# Patient Record
Sex: MALE | Race: WHITE | NOT HISPANIC OR LATINO | Employment: FULL TIME | URBAN - METROPOLITAN AREA
[De-identification: names, ages, dates, MRNs, and addresses within clinical notes are randomized per-mention and may not be internally consistent; named-entity substitution may affect disease eponyms.]

---

## 2023-09-22 ENCOUNTER — PATIENT OUTREACH (OUTPATIENT)
Dept: PRIMARY CARE | Facility: CLINIC | Age: 48
End: 2023-09-22
Payer: COMMERCIAL

## 2023-09-22 RX ORDER — CLOPIDOGREL BISULFATE 75 MG/1
75 TABLET ORAL DAILY
COMMUNITY

## 2023-09-22 RX ORDER — ROSUVASTATIN CALCIUM 40 MG/1
40 TABLET, COATED ORAL DAILY
COMMUNITY

## 2023-09-22 NOTE — PROGRESS NOTES
Discharge Facility: [facility]  Discharge Diagnosis: NSTEMI   Admission Date: 9/20/2023  Discharge Date: 9/21/2023    PCP Appointment Date: 9/29/2023  Specialist Appointment Date: CARIOLOGY 9/28/2023  Hospital Encounter and Summary:  not available at this time   See discharge assessment below for further details    Engagement  Call Start Time: 0930 (9/22/2023  9:30 AM)    Medications  Medications reviewed with patient/caregiver?: Yes (9/22/2023  9:30 AM)  Is the patient having any side effects they believe may be caused by any medication additions or changes?: No (9/22/2023  9:30 AM)  Does the patient have all medications ordered at discharge?: Yes (new : plavix, change: rosuvastatin) (9/22/2023  9:30 AM)  Care Management Interventions: No intervention needed (9/22/2023  9:30 AM)  Is the patient taking all medications as directed (includes completed medication regime)?: Yes (9/22/2023  9:30 AM)  Medication Comments: see med list (9/22/2023  9:30 AM)    Appointments  Does the patient have a primary care provider?: Yes (9/22/2023  9:30 AM)  Care Management Interventions: Advised patient to make appointment (9/22/2023  9:30 AM)  Has the patient kept scheduled appointments due by today?: Yes (9/22/2023  9:30 AM)  Care Management Interventions: Advised patient to keep appointment (9/22/2023  9:30 AM)    Self Management  What is the home health agency?: none (9/22/2023  9:30 AM)  Has home health visited the patient within 72 hours of discharge?: Not applicable (9/22/2023  9:30 AM)    Patient Teaching  Does the patient have access to their discharge instructions?: Yes (9/22/2023  9:30 AM)  Care Management Interventions: Reviewed instructions with patient (9/22/2023  9:30 AM)  What is the patient's perception of their health status since discharge?: Improving (9/22/2023  9:30 AM)  Is the patient/caregiver able to teach back the hierarchy of who to call/visit for symptoms/problems? PCP, Specialist, Home Health nurse,  Urgent Care, ED, 911: Yes (9/22/2023  9:30 AM)    Wrap Up  Wrap Up Additional Comments: spoke with patient. he stated that he is doing ok. reviewed medication changes and additions. patient is aware. denies chest pains and shortness of breath. has follow up with cardiology. patient stated tht he will not be following up with pcp at this time. no questions or concerns at this time. (9/22/2023  9:30 AM)  Call End Time: 0933 (9/22/2023  9:30 AM)

## 2023-09-29 ENCOUNTER — OFFICE VISIT (OUTPATIENT)
Dept: PRIMARY CARE | Facility: CLINIC | Age: 48
End: 2023-09-29
Payer: COMMERCIAL

## 2023-09-29 VITALS
HEART RATE: 76 BPM | SYSTOLIC BLOOD PRESSURE: 126 MMHG | RESPIRATION RATE: 18 BRPM | HEIGHT: 70 IN | TEMPERATURE: 97.5 F | WEIGHT: 248 LBS | DIASTOLIC BLOOD PRESSURE: 60 MMHG | BODY MASS INDEX: 35.5 KG/M2

## 2023-09-29 DIAGNOSIS — I21.4 NON-ST ELEVATION MYOCARDIAL INFARCTION (NSTEMI) (MULTI): Primary | ICD-10-CM

## 2023-09-29 PROCEDURE — 1036F TOBACCO NON-USER: CPT | Performed by: FAMILY MEDICINE

## 2023-09-29 PROCEDURE — 99212 OFFICE O/P EST SF 10 MIN: CPT | Performed by: FAMILY MEDICINE

## 2023-09-29 RX ORDER — NAPROXEN SODIUM 220 MG/1
81 TABLET, FILM COATED ORAL DAILY
COMMUNITY

## 2023-09-29 RX ORDER — METOPROLOL SUCCINATE 50 MG/1
50 TABLET, EXTENDED RELEASE ORAL DAILY
COMMUNITY
Start: 2023-09-21

## 2023-09-29 RX ORDER — PANTOPRAZOLE SODIUM 20 MG/1
20 TABLET, DELAYED RELEASE ORAL
COMMUNITY
Start: 2023-09-21

## 2023-09-29 RX ORDER — LOSARTAN POTASSIUM 25 MG/1
25 TABLET ORAL DAILY
COMMUNITY
Start: 2023-09-21

## 2023-09-29 NOTE — PROGRESS NOTES
"Subjective   Patient ID: Williams Harley is a 48 y.o. male who presents for Follow-up (Patient here for F/U from [facility] for a MI on 9/20/23 ) and heart attack.    Had MI and had angioplasty  seeing  new cardiologist and going to cardiac rehab  Did quit smoking again.    Crestor up to 40 mg   Reviewed labs  CXR         Review of Systems    Objective   /60   Pulse 76   Temp 36.4 °C (97.5 °F)   Resp 18   Ht 1.778 m (5' 10\")   Wt 112 kg (248 lb)   BMI 35.58 kg/m²     Physical Exam  Vitals and nursing note reviewed.   Constitutional:       General: He is not in acute distress.     Appearance: He is not ill-appearing.   HENT:      Head: Normocephalic and atraumatic.      Mouth/Throat:      Mouth: Mucous membranes are moist.   Eyes:      Conjunctiva/sclera: Conjunctivae normal.   Cardiovascular:      Rate and Rhythm: Normal rate and regular rhythm.      Heart sounds: Normal heart sounds.   Pulmonary:      Effort: Pulmonary effort is normal.      Breath sounds: Normal breath sounds.   Skin:     General: Skin is warm.   Neurological:      Mental Status: He is alert.   Psychiatric:         Mood and Affect: Mood normal.         Thought Content: Thought content normal.         Judgment: Judgment normal.         Assessment/Plan   Problem List Items Addressed This Visit             ICD-10-CM    Non-ST elevation myocardial infarction (NSTEMI) (CMS/HCC) - Primary I21.4    Relevant Medications    metoprolol succinate XL (Toprol-XL) 50 mg 24 hr tablet          "

## 2023-10-04 ENCOUNTER — PATIENT OUTREACH (OUTPATIENT)
Dept: PRIMARY CARE | Facility: CLINIC | Age: 48
End: 2023-10-04
Payer: COMMERCIAL

## 2023-11-02 PROBLEM — K75.81 NASH (NONALCOHOLIC STEATOHEPATITIS): Status: ACTIVE | Noted: 2023-11-02

## 2023-11-02 PROBLEM — E66.9 OBESITY, CLASS II, BMI 35-39.9: Status: ACTIVE | Noted: 2022-03-01

## 2023-11-02 PROBLEM — G47.9 SLEEP DISTURBANCE: Status: ACTIVE | Noted: 2023-11-02

## 2023-11-02 PROBLEM — F41.8 DEPRESSION WITH ANXIETY: Status: ACTIVE | Noted: 2023-11-02

## 2023-11-02 PROBLEM — R73.9 HYPERGLYCEMIA: Status: ACTIVE | Noted: 2022-12-13

## 2023-11-02 PROBLEM — I11.0 HYPERTENSIVE HEART DISEASE WITH HEART FAILURE (MULTI): Status: ACTIVE | Noted: 2022-12-13

## 2023-11-02 PROBLEM — M19.90 OSTEOARTHRITIS: Status: ACTIVE | Noted: 2023-11-02

## 2023-11-02 PROBLEM — R06.02 SHORTNESS OF BREATH: Status: ACTIVE | Noted: 2022-12-13

## 2023-11-02 PROBLEM — M47.817 LUMBOSACRAL SPONDYLOSIS WITHOUT MYELOPATHY: Status: ACTIVE | Noted: 2017-04-25

## 2023-11-02 PROBLEM — Z95.5 PRESENCE OF CORONARY ANGIOPLASTY IMPLANT AND GRAFT: Status: ACTIVE | Noted: 2022-12-13

## 2023-11-02 PROBLEM — I50.32 CHRONIC DIASTOLIC HEART FAILURE (MULTI): Status: ACTIVE | Noted: 2022-12-13

## 2023-11-02 PROBLEM — Z20.822 CONTACT WITH AND (SUSPECTED) EXPOSURE TO COVID-19: Status: ACTIVE | Noted: 2022-12-13

## 2023-11-02 PROBLEM — I25.10 ATHSCL HEART DISEASE OF NATIVE CORONARY ARTERY W/O ANG PCTRS: Status: ACTIVE | Noted: 2022-12-13

## 2023-11-02 PROBLEM — S92.009A CLOSED FRACTURE OF CALCANEUS: Status: ACTIVE | Noted: 2018-10-26

## 2023-11-02 PROBLEM — F17.200 NICOTINE DEPENDENCE, UNSPECIFIED, UNCOMPLICATED: Status: ACTIVE | Noted: 2022-12-13

## 2023-11-02 PROBLEM — K52.9 COLITIS: Status: ACTIVE | Noted: 2022-08-05

## 2023-11-02 PROBLEM — E78.00 HYPERCHOLESTEROLEMIA: Status: ACTIVE | Noted: 2023-11-02

## 2023-11-02 PROBLEM — R07.9 CHEST PAIN: Status: ACTIVE | Noted: 2022-02-28

## 2023-11-02 PROBLEM — I10 HYPERTENSION: Status: ACTIVE | Noted: 2023-11-02

## 2023-11-02 PROBLEM — E66.812 OBESITY, CLASS II, BMI 35-39.9: Status: ACTIVE | Noted: 2022-03-01

## 2023-11-02 PROBLEM — L25.9 CONTACT DERMATITIS: Status: ACTIVE | Noted: 2023-11-02

## 2023-11-02 PROBLEM — I10 BENIGN ESSENTIAL HYPERTENSION: Status: ACTIVE | Noted: 2023-09-28

## 2023-11-02 PROBLEM — I70.219 EXTREMITY ATHEROSCLEROSIS WITH INTERMITTENT CLAUDICATION (CMS-HCC): Status: ACTIVE | Noted: 2023-11-02

## 2023-11-02 PROBLEM — K21.9 GERD (GASTROESOPHAGEAL REFLUX DISEASE): Status: ACTIVE | Noted: 2023-11-02

## 2023-11-02 RX ORDER — SENNOSIDES 8.6 MG/1
1 TABLET ORAL DAILY
COMMUNITY

## 2023-11-02 RX ORDER — MULTIVITAMIN
1 TABLET ORAL DAILY
COMMUNITY

## 2023-11-02 RX ORDER — CILOSTAZOL 100 MG/1
100 TABLET ORAL
COMMUNITY

## 2023-11-02 RX ORDER — MELOXICAM 15 MG/1
15 TABLET ORAL DAILY
COMMUNITY
Start: 2015-08-19

## 2023-11-02 RX ORDER — FUROSEMIDE 40 MG/1
40 TABLET ORAL
COMMUNITY
Start: 2015-10-21

## 2023-11-02 RX ORDER — DIPHENOXYLATE HYDROCHLORIDE AND ATROPINE SULFATE 2.5; .025 MG/1; MG/1
1 TABLET ORAL
COMMUNITY

## 2023-11-02 RX ORDER — TIZANIDINE 4 MG/1
4 TABLET ORAL EVERY 8 HOURS PRN
COMMUNITY
Start: 2017-04-25

## 2023-11-02 RX ORDER — NITROGLYCERIN 0.4 MG/1
0.4 TABLET SUBLINGUAL EVERY 5 MIN PRN
COMMUNITY
Start: 2022-02-02 | End: 2024-09-27

## 2023-11-02 RX ORDER — ERGOCALCIFEROL 1.25 MG/1
1 CAPSULE ORAL WEEKLY
COMMUNITY

## 2023-11-02 RX ORDER — AMOXICILLIN 250 MG
1 CAPSULE ORAL
COMMUNITY

## 2023-11-02 RX ORDER — BUPROPION HYDROCHLORIDE 150 MG/1
150 TABLET ORAL
COMMUNITY

## 2023-11-03 ENCOUNTER — PATIENT OUTREACH (OUTPATIENT)
Dept: PRIMARY CARE | Facility: CLINIC | Age: 48
End: 2023-11-03
Payer: COMMERCIAL

## 2023-11-03 NOTE — PROGRESS NOTES
Call placed regarding one month post discharge follow up call.  At time of outreach call the patient feels as if their condition has returned to baseline since initial visit with PCP or specialist.  Questions or concerns regarding recovery period addressed at this time.No questions or concerns at this time.  Reviewed any PCP or specialists progress notes/labs/radiology reports if applicable and addressed any questions or concerns.

## 2023-11-08 ENCOUNTER — APPOINTMENT (OUTPATIENT)
Dept: CARDIOLOGY | Facility: CLINIC | Age: 48
End: 2023-11-08
Payer: COMMERCIAL

## 2023-12-05 ENCOUNTER — PATIENT OUTREACH (OUTPATIENT)
Dept: PRIMARY CARE | Facility: CLINIC | Age: 48
End: 2023-12-05
Payer: COMMERCIAL

## 2024-01-23 PROBLEM — I21.4 NON-ST ELEVATION MYOCARDIAL INFARCTION (NSTEMI) (MULTI): Status: RESOLVED | Noted: 2023-09-29 | Resolved: 2024-01-23

## 2024-01-23 PROBLEM — I11.0 HYPERTENSIVE HEART DISEASE WITH HEART FAILURE (MULTI): Status: RESOLVED | Noted: 2022-12-13 | Resolved: 2024-01-23

## 2024-01-23 PROBLEM — R07.9 CHEST PAIN: Status: RESOLVED | Noted: 2022-02-28 | Resolved: 2024-01-23

## 2024-01-23 PROBLEM — E78.5 DYSLIPIDEMIA: Status: ACTIVE | Noted: 2023-11-02

## 2024-01-23 PROBLEM — I10 BENIGN ESSENTIAL HYPERTENSION: Status: RESOLVED | Noted: 2023-09-28 | Resolved: 2024-01-23

## 2024-09-25 ENCOUNTER — HOSPITAL ENCOUNTER (OUTPATIENT)
Facility: HOSPITAL | Age: 49
Setting detail: OBSERVATION
Discharge: HOME | End: 2024-09-26
Attending: INTERNAL MEDICINE | Admitting: STUDENT IN AN ORGANIZED HEALTH CARE EDUCATION/TRAINING PROGRAM
Payer: COMMERCIAL

## 2024-09-25 DIAGNOSIS — R07.89 OTHER CHEST PAIN: Primary | ICD-10-CM

## 2024-09-25 PROBLEM — R07.9 CHEST PAIN: Status: ACTIVE | Noted: 2024-09-25

## 2024-09-25 LAB
ALBUMIN SERPL BCP-MCNC: 4.1 G/DL (ref 3.4–5)
ALP SERPL-CCNC: 60 U/L (ref 33–120)
ALT SERPL W P-5'-P-CCNC: 52 U/L (ref 10–52)
ANION GAP SERPL CALC-SCNC: 10 MMOL/L (ref 10–20)
AST SERPL W P-5'-P-CCNC: 37 U/L (ref 9–39)
BILIRUB SERPL-MCNC: 0.6 MG/DL (ref 0–1.2)
BUN SERPL-MCNC: 11 MG/DL (ref 6–23)
CALCIUM SERPL-MCNC: 8.8 MG/DL (ref 8.6–10.3)
CARDIAC TROPONIN I PNL SERPL HS: 5 NG/L (ref 0–20)
CHLORIDE SERPL-SCNC: 106 MMOL/L (ref 98–107)
CO2 SERPL-SCNC: 26 MMOL/L (ref 21–32)
CREAT SERPL-MCNC: 0.53 MG/DL (ref 0.5–1.3)
EGFRCR SERPLBLD CKD-EPI 2021: >90 ML/MIN/1.73M*2
ERYTHROCYTE [DISTWIDTH] IN BLOOD BY AUTOMATED COUNT: 13.7 % (ref 11.5–14.5)
GLUCOSE SERPL-MCNC: 103 MG/DL (ref 74–99)
HCT VFR BLD AUTO: 43.6 % (ref 41–52)
HGB BLD-MCNC: 14.7 G/DL (ref 13.5–17.5)
INR PPP: 1 (ref 0.9–1.1)
MAGNESIUM SERPL-MCNC: 1.99 MG/DL (ref 1.6–2.4)
MCH RBC QN AUTO: 33.2 PG (ref 26–34)
MCHC RBC AUTO-ENTMCNC: 33.7 G/DL (ref 32–36)
MCV RBC AUTO: 98 FL (ref 80–100)
NRBC BLD-RTO: 0 /100 WBCS (ref 0–0)
PLATELET # BLD AUTO: 244 X10*3/UL (ref 150–450)
POTASSIUM SERPL-SCNC: 3.4 MMOL/L (ref 3.5–5.3)
PROT SERPL-MCNC: 6.4 G/DL (ref 6.4–8.2)
PROTHROMBIN TIME: 11.6 SECONDS (ref 9.8–12.8)
RBC # BLD AUTO: 4.43 X10*6/UL (ref 4.5–5.9)
SODIUM SERPL-SCNC: 139 MMOL/L (ref 136–145)
WBC # BLD AUTO: 7.6 X10*3/UL (ref 4.4–11.3)

## 2024-09-25 PROCEDURE — 85027 COMPLETE CBC AUTOMATED: CPT

## 2024-09-25 PROCEDURE — 80053 COMPREHEN METABOLIC PANEL: CPT

## 2024-09-25 PROCEDURE — G0378 HOSPITAL OBSERVATION PER HR: HCPCS

## 2024-09-25 PROCEDURE — 2500000002 HC RX 250 W HCPCS SELF ADMINISTERED DRUGS (ALT 637 FOR MEDICARE OP, ALT 636 FOR OP/ED)

## 2024-09-25 PROCEDURE — 85610 PROTHROMBIN TIME: CPT

## 2024-09-25 PROCEDURE — 96372 THER/PROPH/DIAG INJ SC/IM: CPT

## 2024-09-25 PROCEDURE — 2500000001 HC RX 250 WO HCPCS SELF ADMINISTERED DRUGS (ALT 637 FOR MEDICARE OP)

## 2024-09-25 PROCEDURE — 84484 ASSAY OF TROPONIN QUANT: CPT

## 2024-09-25 PROCEDURE — 36415 COLL VENOUS BLD VENIPUNCTURE: CPT

## 2024-09-25 PROCEDURE — 83735 ASSAY OF MAGNESIUM: CPT

## 2024-09-25 PROCEDURE — 2500000004 HC RX 250 GENERAL PHARMACY W/ HCPCS (ALT 636 FOR OP/ED)

## 2024-09-25 RX ORDER — OMEPRAZOLE 20 MG/1
20 CAPSULE, DELAYED RELEASE ORAL
COMMUNITY

## 2024-09-25 RX ORDER — ROSUVASTATIN CALCIUM 20 MG/1
40 TABLET, COATED ORAL NIGHTLY
Status: DISCONTINUED | OUTPATIENT
Start: 2024-09-25 | End: 2024-09-26 | Stop reason: HOSPADM

## 2024-09-25 RX ORDER — EZETIMIBE 10 MG/1
10 TABLET ORAL DAILY
COMMUNITY

## 2024-09-25 RX ORDER — FAMOTIDINE 20 MG/1
20 TABLET, FILM COATED ORAL ONCE
Status: COMPLETED | OUTPATIENT
Start: 2024-09-25 | End: 2024-09-25

## 2024-09-25 RX ORDER — ENOXAPARIN SODIUM 100 MG/ML
40 INJECTION SUBCUTANEOUS EVERY 24 HOURS
Status: DISCONTINUED | OUTPATIENT
Start: 2024-09-25 | End: 2024-09-26 | Stop reason: HOSPADM

## 2024-09-25 RX ORDER — ACETAMINOPHEN 500 MG
10 TABLET ORAL ONCE
Status: COMPLETED | OUTPATIENT
Start: 2024-09-25 | End: 2024-09-25

## 2024-09-25 RX ADMIN — FAMOTIDINE 20 MG: 20 TABLET ORAL at 22:42

## 2024-09-25 RX ADMIN — ENOXAPARIN SODIUM 40 MG: 40 INJECTION SUBCUTANEOUS at 22:42

## 2024-09-25 RX ADMIN — Medication 10 MG: at 22:42

## 2024-09-25 RX ADMIN — ROSUVASTATIN CALCIUM 40 MG: 20 TABLET, FILM COATED ORAL at 22:42

## 2024-09-25 SDOH — SOCIAL STABILITY: SOCIAL INSECURITY: ARE YOU OR HAVE YOU BEEN THREATENED OR ABUSED PHYSICALLY, EMOTIONALLY, OR SEXUALLY BY ANYONE?: NO

## 2024-09-25 SDOH — SOCIAL STABILITY: SOCIAL INSECURITY: DO YOU FEEL ANYONE HAS EXPLOITED OR TAKEN ADVANTAGE OF YOU FINANCIALLY OR OF YOUR PERSONAL PROPERTY?: NO

## 2024-09-25 SDOH — SOCIAL STABILITY: SOCIAL INSECURITY: HAS ANYONE EVER THREATENED TO HURT YOUR FAMILY OR YOUR PETS?: NO

## 2024-09-25 SDOH — SOCIAL STABILITY: SOCIAL INSECURITY: DOES ANYONE TRY TO KEEP YOU FROM HAVING/CONTACTING OTHER FRIENDS OR DOING THINGS OUTSIDE YOUR HOME?: NO

## 2024-09-25 SDOH — SOCIAL STABILITY: SOCIAL INSECURITY: HAVE YOU HAD ANY THOUGHTS OF HARMING ANYONE ELSE?: NO

## 2024-09-25 SDOH — SOCIAL STABILITY: SOCIAL INSECURITY: ABUSE: ADULT

## 2024-09-25 SDOH — SOCIAL STABILITY: SOCIAL INSECURITY: WERE YOU ABLE TO COMPLETE ALL THE BEHAVIORAL HEALTH SCREENINGS?: YES

## 2024-09-25 SDOH — SOCIAL STABILITY: SOCIAL INSECURITY: HAVE YOU HAD THOUGHTS OF HARMING ANYONE ELSE?: NO

## 2024-09-25 SDOH — SOCIAL STABILITY: SOCIAL INSECURITY: DO YOU FEEL UNSAFE GOING BACK TO THE PLACE WHERE YOU ARE LIVING?: NO

## 2024-09-25 SDOH — SOCIAL STABILITY: SOCIAL INSECURITY: ARE THERE ANY APPARENT SIGNS OF INJURIES/BEHAVIORS THAT COULD BE RELATED TO ABUSE/NEGLECT?: NO

## 2024-09-25 ASSESSMENT — ACTIVITIES OF DAILY LIVING (ADL)
JUDGMENT_ADEQUATE_SAFELY_COMPLETE_DAILY_ACTIVITIES: YES
HEARING - LEFT EAR: FUNCTIONAL
HEARING - RIGHT EAR: FUNCTIONAL
PATIENT'S MEMORY ADEQUATE TO SAFELY COMPLETE DAILY ACTIVITIES?: YES
GROOMING: INDEPENDENT
TOILETING: INDEPENDENT
FEEDING YOURSELF: INDEPENDENT
WALKS IN HOME: INDEPENDENT
ADEQUATE_TO_COMPLETE_ADL: YES
DRESSING YOURSELF: INDEPENDENT
BATHING: INDEPENDENT

## 2024-09-25 ASSESSMENT — PATIENT HEALTH QUESTIONNAIRE - PHQ9
1. LITTLE INTEREST OR PLEASURE IN DOING THINGS: NOT AT ALL
2. FEELING DOWN, DEPRESSED OR HOPELESS: NOT AT ALL
SUM OF ALL RESPONSES TO PHQ9 QUESTIONS 1 & 2: 0

## 2024-09-25 ASSESSMENT — COGNITIVE AND FUNCTIONAL STATUS - GENERAL
MOBILITY SCORE: 24
PATIENT BASELINE BEDBOUND: NO
DAILY ACTIVITIY SCORE: 24

## 2024-09-25 ASSESSMENT — LIFESTYLE VARIABLES
SKIP TO QUESTIONS 9-10: 1
PRESCIPTION_ABUSE_PAST_12_MONTHS: NO
AUDIT-C TOTAL SCORE: 1
AUDIT-C TOTAL SCORE: 1
HOW OFTEN DO YOU HAVE A DRINK CONTAINING ALCOHOL: MONTHLY OR LESS
HOW OFTEN DO YOU HAVE 6 OR MORE DRINKS ON ONE OCCASION: NEVER
HOW MANY STANDARD DRINKS CONTAINING ALCOHOL DO YOU HAVE ON A TYPICAL DAY: 1 OR 2
SUBSTANCE_ABUSE_PAST_12_MONTHS: NO

## 2024-09-25 ASSESSMENT — COLUMBIA-SUICIDE SEVERITY RATING SCALE - C-SSRS
2. HAVE YOU ACTUALLY HAD ANY THOUGHTS OF KILLING YOURSELF?: NO
6. HAVE YOU EVER DONE ANYTHING, STARTED TO DO ANYTHING, OR PREPARED TO DO ANYTHING TO END YOUR LIFE?: NO
1. IN THE PAST MONTH, HAVE YOU WISHED YOU WERE DEAD OR WISHED YOU COULD GO TO SLEEP AND NOT WAKE UP?: NO

## 2024-09-25 ASSESSMENT — PAIN SCALES - GENERAL: PAINLEVEL_OUTOF10: 0 - NO PAIN

## 2024-09-25 ASSESSMENT — PAIN - FUNCTIONAL ASSESSMENT: PAIN_FUNCTIONAL_ASSESSMENT: 0-10

## 2024-09-26 ENCOUNTER — APPOINTMENT (OUTPATIENT)
Dept: RADIOLOGY | Facility: HOSPITAL | Age: 49
End: 2024-09-26
Payer: COMMERCIAL

## 2024-09-26 ENCOUNTER — APPOINTMENT (OUTPATIENT)
Dept: CARDIOLOGY | Facility: HOSPITAL | Age: 49
End: 2024-09-26
Payer: COMMERCIAL

## 2024-09-26 VITALS
BODY MASS INDEX: 36.11 KG/M2 | TEMPERATURE: 97.2 F | HEART RATE: 75 BPM | SYSTOLIC BLOOD PRESSURE: 133 MMHG | RESPIRATION RATE: 16 BRPM | HEIGHT: 70 IN | DIASTOLIC BLOOD PRESSURE: 75 MMHG | WEIGHT: 252.21 LBS | OXYGEN SATURATION: 97 %

## 2024-09-26 LAB
CARDIAC TROPONIN I PNL SERPL HS: 4 NG/L (ref 0–20)
HOLD SPECIMEN: NORMAL

## 2024-09-26 PROCEDURE — 3430000001 HC RX 343 DIAGNOSTIC RADIOPHARMACEUTICALS: Performed by: INTERNAL MEDICINE

## 2024-09-26 PROCEDURE — 78452 HT MUSCLE IMAGE SPECT MULT: CPT | Performed by: RADIOLOGY

## 2024-09-26 PROCEDURE — 93018 CV STRESS TEST I&R ONLY: CPT | Performed by: INTERNAL MEDICINE

## 2024-09-26 PROCEDURE — 93016 CV STRESS TEST SUPVJ ONLY: CPT | Performed by: INTERNAL MEDICINE

## 2024-09-26 PROCEDURE — 93017 CV STRESS TEST TRACING ONLY: CPT

## 2024-09-26 PROCEDURE — 2500000002 HC RX 250 W HCPCS SELF ADMINISTERED DRUGS (ALT 637 FOR MEDICARE OP, ALT 636 FOR OP/ED): Performed by: INTERNAL MEDICINE

## 2024-09-26 PROCEDURE — 2500000001 HC RX 250 WO HCPCS SELF ADMINISTERED DRUGS (ALT 637 FOR MEDICARE OP)

## 2024-09-26 PROCEDURE — 93005 ELECTROCARDIOGRAM TRACING: CPT

## 2024-09-26 PROCEDURE — 78452 HT MUSCLE IMAGE SPECT MULT: CPT

## 2024-09-26 PROCEDURE — 99222 1ST HOSP IP/OBS MODERATE 55: CPT

## 2024-09-26 PROCEDURE — 36415 COLL VENOUS BLD VENIPUNCTURE: CPT | Performed by: INTERNAL MEDICINE

## 2024-09-26 PROCEDURE — 84484 ASSAY OF TROPONIN QUANT: CPT | Performed by: INTERNAL MEDICINE

## 2024-09-26 PROCEDURE — 99239 HOSP IP/OBS DSCHRG MGMT >30: CPT | Performed by: INTERNAL MEDICINE

## 2024-09-26 PROCEDURE — A9502 TC99M TETROFOSMIN: HCPCS | Performed by: INTERNAL MEDICINE

## 2024-09-26 PROCEDURE — G0378 HOSPITAL OBSERVATION PER HR: HCPCS

## 2024-09-26 PROCEDURE — 2500000001 HC RX 250 WO HCPCS SELF ADMINISTERED DRUGS (ALT 637 FOR MEDICARE OP): Performed by: INTERNAL MEDICINE

## 2024-09-26 PROCEDURE — 93010 ELECTROCARDIOGRAM REPORT: CPT | Performed by: INTERNAL MEDICINE

## 2024-09-26 RX ORDER — PANTOPRAZOLE SODIUM 20 MG/1
20 TABLET, DELAYED RELEASE ORAL
Status: DISCONTINUED | OUTPATIENT
Start: 2024-09-27 | End: 2024-09-26 | Stop reason: HOSPADM

## 2024-09-26 RX ORDER — ACETAMINOPHEN 160 MG/5ML
650 SOLUTION ORAL EVERY 4 HOURS PRN
Status: DISCONTINUED | OUTPATIENT
Start: 2024-09-26 | End: 2024-09-26 | Stop reason: HOSPADM

## 2024-09-26 RX ORDER — ACETAMINOPHEN 325 MG/1
650 TABLET ORAL EVERY 4 HOURS PRN
Status: DISCONTINUED | OUTPATIENT
Start: 2024-09-26 | End: 2024-09-26 | Stop reason: HOSPADM

## 2024-09-26 RX ORDER — CLOPIDOGREL BISULFATE 75 MG/1
75 TABLET ORAL DAILY
Status: DISCONTINUED | OUTPATIENT
Start: 2024-09-26 | End: 2024-09-26 | Stop reason: HOSPADM

## 2024-09-26 RX ORDER — AMOXICILLIN 250 MG
1 CAPSULE ORAL 2 TIMES DAILY
Status: DISCONTINUED | OUTPATIENT
Start: 2024-09-26 | End: 2024-09-26 | Stop reason: HOSPADM

## 2024-09-26 RX ORDER — ACETAMINOPHEN 650 MG/1
650 SUPPOSITORY RECTAL EVERY 4 HOURS PRN
Status: DISCONTINUED | OUTPATIENT
Start: 2024-09-26 | End: 2024-09-26 | Stop reason: HOSPADM

## 2024-09-26 RX ORDER — HYDROCODONE BITARTRATE AND ACETAMINOPHEN 5; 325 MG/1; MG/1
1 TABLET ORAL ONCE
Status: COMPLETED | OUTPATIENT
Start: 2024-09-26 | End: 2024-09-26

## 2024-09-26 RX ORDER — METOPROLOL SUCCINATE 50 MG/1
50 TABLET, EXTENDED RELEASE ORAL DAILY
Status: DISCONTINUED | OUTPATIENT
Start: 2024-09-26 | End: 2024-09-26 | Stop reason: HOSPADM

## 2024-09-26 RX ORDER — LOSARTAN POTASSIUM 25 MG/1
25 TABLET ORAL DAILY
Status: DISCONTINUED | OUTPATIENT
Start: 2024-09-26 | End: 2024-09-26 | Stop reason: HOSPADM

## 2024-09-26 RX ORDER — IBUPROFEN 400 MG/1
400 TABLET ORAL ONCE
Status: COMPLETED | OUTPATIENT
Start: 2024-09-26 | End: 2024-09-26

## 2024-09-26 RX ORDER — EZETIMIBE 10 MG/1
10 TABLET ORAL DAILY
Status: DISCONTINUED | OUTPATIENT
Start: 2024-09-26 | End: 2024-09-26 | Stop reason: HOSPADM

## 2024-09-26 RX ORDER — NAPROXEN SODIUM 220 MG/1
81 TABLET, FILM COATED ORAL DAILY
Status: DISCONTINUED | OUTPATIENT
Start: 2024-09-26 | End: 2024-09-26 | Stop reason: HOSPADM

## 2024-09-26 RX ORDER — LIDOCAINE 560 MG/1
1 PATCH PERCUTANEOUS; TOPICAL; TRANSDERMAL DAILY
Status: DISCONTINUED | OUTPATIENT
Start: 2024-09-26 | End: 2024-09-26 | Stop reason: HOSPADM

## 2024-09-26 RX ADMIN — CLOPIDOGREL 75 MG: 75 TABLET ORAL at 08:38

## 2024-09-26 RX ADMIN — HYDROCODONE BITARTRATE AND ACETAMINOPHEN 1 TABLET: 5; 325 TABLET ORAL at 04:54

## 2024-09-26 RX ADMIN — TETROFOSMIN 11.9 MILLICURIE: 0.23 INJECTION, POWDER, LYOPHILIZED, FOR SOLUTION INTRAVENOUS at 11:35

## 2024-09-26 RX ADMIN — EZETIMIBE 10 MG: 10 TABLET ORAL at 15:32

## 2024-09-26 RX ADMIN — IBUPROFEN 400 MG: 400 TABLET, FILM COATED ORAL at 08:38

## 2024-09-26 RX ADMIN — ASPIRIN 81 MG: 81 TABLET, CHEWABLE ORAL at 08:37

## 2024-09-26 RX ADMIN — METOPROLOL SUCCINATE 50 MG: 50 TABLET, EXTENDED RELEASE ORAL at 15:32

## 2024-09-26 RX ADMIN — TETROFOSMIN 36 MILLICURIE: 0.23 INJECTION, POWDER, LYOPHILIZED, FOR SOLUTION INTRAVENOUS at 12:50

## 2024-09-26 RX ADMIN — LOSARTAN POTASSIUM 25 MG: 25 TABLET, FILM COATED ORAL at 15:32

## 2024-09-26 SDOH — SOCIAL STABILITY: SOCIAL INSECURITY
WITHIN THE LAST YEAR, HAVE YOU BEEN KICKED, HIT, SLAPPED, OR OTHERWISE PHYSICALLY HURT BY YOUR PARTNER OR EX-PARTNER?: NO

## 2024-09-26 SDOH — ECONOMIC STABILITY: INCOME INSECURITY: IN THE PAST 12 MONTHS, HAS THE ELECTRIC, GAS, OIL, OR WATER COMPANY THREATENED TO SHUT OFF SERVICE IN YOUR HOME?: NO

## 2024-09-26 SDOH — SOCIAL STABILITY: SOCIAL NETWORK
DO YOU BELONG TO ANY CLUBS OR ORGANIZATIONS SUCH AS CHURCH GROUPS UNIONS, FRATERNAL OR ATHLETIC GROUPS, OR SCHOOL GROUPS?: YES

## 2024-09-26 SDOH — HEALTH STABILITY: MENTAL HEALTH
HOW OFTEN DO YOU NEED TO HAVE SOMEONE HELP YOU WHEN YOU READ INSTRUCTIONS, PAMPHLETS, OR OTHER WRITTEN MATERIAL FROM YOUR DOCTOR OR PHARMACY?: NEVER

## 2024-09-26 SDOH — SOCIAL STABILITY: SOCIAL INSECURITY: WITHIN THE LAST YEAR, HAVE YOU BEEN HUMILIATED OR EMOTIONALLY ABUSED IN OTHER WAYS BY YOUR PARTNER OR EX-PARTNER?: NO

## 2024-09-26 SDOH — ECONOMIC STABILITY: FOOD INSECURITY: WITHIN THE PAST 12 MONTHS, YOU WORRIED THAT YOUR FOOD WOULD RUN OUT BEFORE YOU GOT MONEY TO BUY MORE.: NEVER TRUE

## 2024-09-26 SDOH — SOCIAL STABILITY: SOCIAL INSECURITY: WITHIN THE LAST YEAR, HAVE YOU BEEN AFRAID OF YOUR PARTNER OR EX-PARTNER?: NO

## 2024-09-26 SDOH — HEALTH STABILITY: MENTAL HEALTH
STRESS IS WHEN SOMEONE FEELS TENSE, NERVOUS, ANXIOUS, OR CAN'T SLEEP AT NIGHT BECAUSE THEIR MIND IS TROUBLED. HOW STRESSED ARE YOU?: NOT AT ALL

## 2024-09-26 SDOH — SOCIAL STABILITY: SOCIAL NETWORK: ARE YOU MARRIED, WIDOWED, DIVORCED, SEPARATED, NEVER MARRIED, OR LIVING WITH A PARTNER?: MARRIED

## 2024-09-26 SDOH — SOCIAL STABILITY: SOCIAL NETWORK
IN A TYPICAL WEEK, HOW MANY TIMES DO YOU TALK ON THE PHONE WITH FAMILY, FRIENDS, OR NEIGHBORS?: MORE THAN THREE TIMES A WEEK

## 2024-09-26 SDOH — SOCIAL STABILITY: SOCIAL INSECURITY
WITHIN THE LAST YEAR, HAVE TO BEEN RAPED OR FORCED TO HAVE ANY KIND OF SEXUAL ACTIVITY BY YOUR PARTNER OR EX-PARTNER?: NO

## 2024-09-26 SDOH — ECONOMIC STABILITY: FOOD INSECURITY: WITHIN THE PAST 12 MONTHS, THE FOOD YOU BOUGHT JUST DIDN'T LAST AND YOU DIDN'T HAVE MONEY TO GET MORE.: NEVER TRUE

## 2024-09-26 SDOH — ECONOMIC STABILITY: INCOME INSECURITY: HOW HARD IS IT FOR YOU TO PAY FOR THE VERY BASICS LIKE FOOD, HOUSING, MEDICAL CARE, AND HEATING?: NOT HARD AT ALL

## 2024-09-26 SDOH — ECONOMIC STABILITY: TRANSPORTATION INSECURITY
IN THE PAST 12 MONTHS, HAS THE LACK OF TRANSPORTATION KEPT YOU FROM MEDICAL APPOINTMENTS OR FROM GETTING MEDICATIONS?: NO

## 2024-09-26 SDOH — HEALTH STABILITY: PHYSICAL HEALTH: ON AVERAGE, HOW MANY MINUTES DO YOU ENGAGE IN EXERCISE AT THIS LEVEL?: 30 MIN

## 2024-09-26 SDOH — ECONOMIC STABILITY: HOUSING INSECURITY: IN THE PAST 12 MONTHS, HOW MANY TIMES HAVE YOU MOVED WHERE YOU WERE LIVING?: 1

## 2024-09-26 SDOH — SOCIAL STABILITY: SOCIAL NETWORK: HOW OFTEN DO YOU ATTEND CHURCH OR RELIGIOUS SERVICES?: 1 TO 4 TIMES PER YEAR

## 2024-09-26 SDOH — HEALTH STABILITY: PHYSICAL HEALTH: ON AVERAGE, HOW MANY DAYS PER WEEK DO YOU ENGAGE IN MODERATE TO STRENUOUS EXERCISE (LIKE A BRISK WALK)?: 4 DAYS

## 2024-09-26 SDOH — ECONOMIC STABILITY: INCOME INSECURITY: IN THE LAST 12 MONTHS, WAS THERE A TIME WHEN YOU WERE NOT ABLE TO PAY THE MORTGAGE OR RENT ON TIME?: NO

## 2024-09-26 SDOH — SOCIAL STABILITY: SOCIAL NETWORK: HOW OFTEN DO YOU ATTENT MEETINGS OF THE CLUB OR ORGANIZATION YOU BELONG TO?: 1 TO 4 TIMES PER YEAR

## 2024-09-26 SDOH — ECONOMIC STABILITY: HOUSING INSECURITY: AT ANY TIME IN THE PAST 12 MONTHS, WERE YOU HOMELESS OR LIVING IN A SHELTER (INCLUDING NOW)?: NO

## 2024-09-26 SDOH — ECONOMIC STABILITY: TRANSPORTATION INSECURITY
IN THE PAST 12 MONTHS, HAS LACK OF TRANSPORTATION KEPT YOU FROM MEETINGS, WORK, OR FROM GETTING THINGS NEEDED FOR DAILY LIVING?: NO

## 2024-09-26 SDOH — SOCIAL STABILITY: SOCIAL NETWORK: HOW OFTEN DO YOU GET TOGETHER WITH FRIENDS OR RELATIVES?: MORE THAN THREE TIMES A WEEK

## 2024-09-26 ASSESSMENT — PAIN SCALES - GENERAL
PAINLEVEL_OUTOF10: 0 - NO PAIN
PAINLEVEL_OUTOF10: 0 - NO PAIN
PAINLEVEL_OUTOF10: 6
PAINLEVEL_OUTOF10: 7

## 2024-09-26 ASSESSMENT — PAIN - FUNCTIONAL ASSESSMENT
PAIN_FUNCTIONAL_ASSESSMENT: 0-10
PAIN_FUNCTIONAL_ASSESSMENT: 0-10

## 2024-09-26 ASSESSMENT — PAIN DESCRIPTION - ORIENTATION: ORIENTATION: LOWER

## 2024-09-26 ASSESSMENT — PAIN DESCRIPTION - LOCATION: LOCATION: BACK

## 2024-09-26 NOTE — PROGRESS NOTES
Spiritual Care Visit    Clinical Encounter Type  Visited With: Patient  Routine Visit: Introduction  Continue Visiting: No                                            Taxonomy  Intended Effects: Promote sense of peace, Preserve dignity and respect, Meaning-making  Methods: Offer spiritual/Presybeterian support  Interventions: Share words of hope and inspiration, [place]    Patient was with his wife.  Patient talked about his children.   listened to his story and provided companionship and validation.   prayed at his request.

## 2024-09-26 NOTE — CARE PLAN
The patient's goals for the shift include sleep    The clinical goals for the shift include pt will remain hemodynamically stable this shift    Over the shift, the patient did make progress toward the following goals. Pt denied c/p, pressure since admission.  Pt compliant with npo- Dr Jones to see.  Safety measures maintainted.

## 2024-09-26 NOTE — CARE PLAN
The clinical goals for the shift include remain free from chest pain this shift      Problem: Pain - Adult  Goal: Verbalizes/displays adequate comfort level or baseline comfort level  Outcome: Progressing  Flowsheets (Taken 9/26/2024 1051)  Verbalizes/displays adequate comfort level or baseline comfort level:   Encourage patient to monitor pain and request assistance   Assess pain using appropriate pain scale   Administer analgesics based on type and severity of pain and evaluate response   Implement non-pharmacological measures as appropriate and evaluate response   Consider cultural and social influences on pain and pain management   Notify Licensed Independent Practitioner if interventions unsuccessful or patient reports new pain     Problem: Safety - Adult  Goal: Free from fall injury  Outcome: Progressing  Flowsheets (Taken 9/26/2024 1051)  Free from fall injury:   Instruct family/caregiver on patient safety   Based on caregiver fall risk screen, instruct family/caregiver to ask for assistance with transferring infant if caregiver noted to have fall risk factors     Problem: Discharge Planning  Goal: Discharge to home or other facility with appropriate resources  Outcome: Progressing  Flowsheets (Taken 9/26/2024 1051)  Discharge to home or other facility with appropriate resources:   Identify barriers to discharge with patient and caregiver   Arrange for needed discharge resources and transportation as appropriate   Identify discharge learning needs (meds, wound care, etc)   Arrange for interpreters to assist at discharge as needed   Refer to discharge planning if patient needs post-hospital services based on physician order or complex needs related to functional status, cognitive ability or social support system     Problem: Chronic Conditions and Co-morbidities  Goal: Patient's chronic conditions and co-morbidity symptoms are monitored and maintained or improved  Outcome: Progressing  Flowsheets (Taken  9/26/2024 1051)  Care Plan - Patient's Chronic Conditions and Co-Morbidity Symptoms are Monitored and Maintained or Improved:   Monitor and assess patient's chronic conditions and comorbid symptoms for stability, deterioration, or improvement   Collaborate with multidisciplinary team to address chronic and comorbid conditions and prevent exacerbation or deterioration   Update acute care plan with appropriate goals if chronic or comorbid symptoms are exacerbated and prevent overall improvement and discharge     Problem: Fall/Injury  Goal: Not fall by end of shift  Outcome: Progressing  Goal: Be free from injury by end of the shift  Outcome: Progressing  Goal: Verbalize understanding of personal risk factors for fall in the hospital  Outcome: Progressing     Problem: Pain  Goal: Takes deep breaths with improved pain control throughout the shift  Outcome: Progressing  Goal: Turns in bed with improved pain control throughout the shift  Outcome: Progressing  Goal: Walks with improved pain control throughout the shift  Outcome: Progressing  Goal: Performs ADL's with improved pain control throughout shift  Outcome: Progressing  Goal: Participates in PT with improved pain control throughout the shift  Outcome: Progressing  Goal: Free from opioid side effects throughout the shift  Outcome: Progressing  Goal: Free from acute confusion related to pain meds throughout the shift  Outcome: Progressing     Problem: ACS/CP/NSTEMI/STEMI  Goal: Chest pain managed (free from pain or at acceptable level)  Outcome: Progressing  Flowsheets (Taken 9/26/2024 1051)  Chest pain managed (free from pain or at acceptable level): Eliminate/minimize actual/potential pain  Goal: Promote self management  Outcome: Progressing  Flowsheets (Taken 9/26/2024 1051)  Promote self management:   Encourage activity/cluster activity to conserve energy   Promote nutrition/identify dietary restrictions   Monitor for depression/anxiety/coping ability

## 2024-09-26 NOTE — H&P
History Of Present Illness  53 YOM PMH CAD s/p PCI to LCx and RCA, dyslipidemia, and chronic diastolic heart failure presenting to [facility] C/O chest pain.    Pain began this a.m. while patient was sitting at his desk.  Duration lasted for 2 to 3 hours he stated.  Pain described as a dull achy pain in the left side of his chest radiating to the shoulder.  Did not radiate to the neck or jaw or back.  He denies associated symptoms of SOB, palpitations, diaphoresis, nausea/emesis, dizziness or presyncope.    Records are not viewable from [facility], he was transferred to [facility] due to request to be evaluated by his cardiologist Dr. Jones.     Past Medical History  He has a past medical history of Chronic systolic (congestive) heart failure (Multi) (01/03/2017), Hemorrhage of anus and rectum (12/14/2015), Personal history of other infectious and parasitic diseases, Pure hyperglyceridemia, Sciatica, unspecified side, and ST elevation (STEMI) myocardial infarction involving right coronary artery (Multi) (10/12/2015).    Surgical History  He has a past surgical history that includes Back surgery (12/14/2015) and Other surgical history (12/14/2015).     Social History  He reports that he has quit smoking. His smoking use included cigarettes. He has never used smokeless tobacco. He reports current alcohol use. He reports that he does not use drugs.    Family History  Family History   Problem Relation Name Age of Onset    No Known Problems Mother      No Known Problems Father          Allergies  Ticagrelor, Ciprofloxacin, and Atorvastatin    Review of Systems    12 pt ROS obtained: positives & pertinent negatives listed in HPI   Physical Exam   Constitutional: A&Ox4, NAD, resting comfortable   Head and Face: Atraumatic, normocephalic   Eyes: Normal external exam, EOMI  ENT: Normal external inspection of ears and nose. Oropharynx normal.  Cardiovascular: RRR, S1/S2, no murmurs, rubs, or gallops, radial pulses +2  Pulmonary: CTAB, no  respiratory distress, no wheezing, rales or rhonchi, on RA  Abdomen: +BS, soft, non-tender, nondistended, no guarding rigidity or rebound tenderness, no masses noted  MSK: Negative for edema, No joint swelling, normal movements of all extremities.   Neuro: No focal deficits, normal motor function, normal sensation, follows all commands  Skin- No lesions, contusions, or erythema.  Psychiatric: Judgment intact. Appropriate mood, affect and behavior   Last Recorded Vitals  /56 (BP Location: Left arm, Patient Position: Lying)   Pulse 80   Temp 36 °C (96.8 °F)   Resp 16   Wt 114 kg (252 lb 3.3 oz)   SpO2 93%     Relevant Results  Results for orders placed or performed during the hospital encounter of 09/25/24 (from the past 24 hour(s))   CBC   Result Value Ref Range    WBC 7.6 4.4 - 11.3 x10*3/uL    nRBC 0.0 0.0 - 0.0 /100 WBCs    RBC 4.43 (L) 4.50 - 5.90 x10*6/uL    Hemoglobin 14.7 13.5 - 17.5 g/dL    Hematocrit 43.6 41.0 - 52.0 %    MCV 98 80 - 100 fL    MCH 33.2 26.0 - 34.0 pg    MCHC 33.7 32.0 - 36.0 g/dL    RDW 13.7 11.5 - 14.5 %    Platelets 244 150 - 450 x10*3/uL   Comprehensive metabolic panel   Result Value Ref Range    Glucose 103 (H) 74 - 99 mg/dL    Sodium 139 136 - 145 mmol/L    Potassium 3.4 (L) 3.5 - 5.3 mmol/L    Chloride 106 98 - 107 mmol/L    Bicarbonate 26 21 - 32 mmol/L    Anion Gap 10 10 - 20 mmol/L    Urea Nitrogen 11 6 - 23 mg/dL    Creatinine 0.53 0.50 - 1.30 mg/dL    eGFR >90 >60 mL/min/1.73m*2    Calcium 8.8 8.6 - 10.3 mg/dL    Albumin 4.1 3.4 - 5.0 g/dL    Alkaline Phosphatase 60 33 - 120 U/L    Total Protein 6.4 6.4 - 8.2 g/dL    AST 37 9 - 39 U/L    Bilirubin, Total 0.6 0.0 - 1.2 mg/dL    ALT 52 10 - 52 U/L   Protime-INR   Result Value Ref Range    Protime 11.6 9.8 - 12.8 seconds    INR 1.0 0.9 - 1.1   Magnesium   Result Value Ref Range    Magnesium 1.99 1.60 - 2.40 mg/dL   Troponin I, High Sensitivity   Result Value Ref Range    Troponin I, High Sensitivity 5 0 - 20 ng/L                Assessment/Plan   Assessment & Plan  Chest pain  Hx of CAD with PCI  Hx HFpEF not in exacerbation  Hx of dyslipidemia    53 YOM PMH CAD s/p PCI to LCx and RCA, dyslipidemia, and chronic diastolic heart failure presenting to [facility] C/O chest pain. ain began this a.m. while patient was sitting at his desk.  Duration lasted for 2 to 3 hours he stated.  Pain described as a dull achy pain in the left side of his chest radiating to the shoulder.  [facility] records are not viewable.  Chest pain appears to be noncardiac in nature however he does have multiple risk factors previous MIs as well as active smoker.    - Check troponin, and repeat AM troponin  - EKG pending   - Cardiology consulted, appreciate recommendations  - Resume crestor, and DAPT  - Telemetry   - NPO after MN until seen by cardiology     IVF: None  DVT Ppx: Lovenox  Diet: NPO  Code Status: FULL CODE confirmed at bedside       Complexity moderate: anticipate 1 MN stay       Bethel Diez DO

## 2024-09-26 NOTE — CONSULTS
Consults    Reason For Consult  Angina    History Of Present Illness  Mr. Harley is a 49-year-old male who presented to [facility] ED 9/26 as a transfer from [facility] at the request of the patient to be seen by home cardiologist, Dr. Jones, with complaints of chest pain that first began at 7 AM the morning prior to presentation while he was at rest at his desk.  Angina lasted 2 -3 hours, described as a dull ache in the left side of his chest with painful radiation to his shoulder.  There was no jaw or neck radiation.  No symptoms of SOB, palpitations, diaphoresis, nausea/vomiting, dizziness or syncope/presyncope.  The angina resolved spontaneously without the use of nitro or other analgesics.  He tells me that the discomfort he experienced yesterday was different than the other symptoms where he was diagnosed with myocardial infarctions. Up until 7 AM yesterday, he was in his normal state of health. Not complaining of any SOB or angina at rest or upon exertion.  He states that he took his blood pressure which was noted to be 157/97, and due to persistent chest discomfort with elevated BP, decided to go to the ER.  By the time he arrived to the ED, his angina and chest discomfort with shoulder pain had resolved.  There is no strenuous activity or other change to his daily events prior to his angina at 7 AM.  He continues to smoke 1.5 PPD.  Changed to social history prior to 7 AM event was that he had 4 cups of 16 ounces of coffee consumption, whereas he normally has 2 cups.  He has been pretty compliant with DAPT and his other cardiac medications.  He states that he does take an intermittent Lasix should he have increased swelling.  He has not taken a Lasix dose for 2 months.  At the time my examination, he is completely asymptomatic from a cardiac standpoint.  He is only complaining of mild chronic low back pain.  He is saturating well on room air and appears comfortable.    ED course:  Vitals: VSS HDS on room air.  No  hypotension or tachycardia/bradycardia.   Labs: CBC and CMP grossly unremarkable with the exception of mild hypokalemia of 3.4  Cardiac: Troponin 5, 4.  No BNP collected.  No CXR collected.  EKG NSR with nonpathologic Q-wave in lead II.  No ST-T changes or other evidence of acute ischemia.  VR 82, QTc 467    Last visit with cardiology, Dr. Jones, 3/2023: During this visit he denied angina and was complaining of stable/improved dyspnea while wearing a mask at work.  He was having some daytime somnolence and having trouble falling and staying asleep.  At the conclusion of this office visit, he was to continue aspirin and high intensity statin.  His clopidogrel was discontinued given and he had been on it more than a year since his PCI.  For his chronic diastolic heart failure, he was to continue Lasix intermittently and continue his metoprolol succinate 25 mg.  His hypertension was well-controlled at that time.  He was encouraged to obtain a polysomnography, however patient wanted to think about retaining the study.  It was recommended that he RTO in 6 months or sooner if needed.  He did suffer another cardiac event 9/2023 where he had severe in-stent restenosis of the distal RCA.  He subsequent underwent angioplasty with balloon to the distal RCA lesion.  He was placed back on DAPT with Plavix and aspirin.    PMH: CAD s/p PCI to the LCx (3/2022) and RCA (6/2017 and 9/2015, angioplasty to distal RCA lesion with balloon 9/2023 (patient had severe in-stent restenosis and distal RCA extending into RPDA at site of prior overlapping stents), HLD, chronic diastolic heart failure, obesity, GERD, DOBBS, OA, tobacco abuse    Nuclear stress test 2/2022: EF 54%.  No evidence of ischemia or scar.  Apical thinning (artifact)  PVR with exercise 4/2018: Mild ischemia of the right leg at rest worsening with ambulation though still in the mild range.  Left lower extremity with significant disease in the JAQUELIN with exercise however no  evidence of ischemia.  Vasculogenic claudication in the right greater than left leg  TTE 9/2023: EF 50%, normal LV size and function.  Normal RV size and function.  Mild to moderate MR.  Mild TR     Past Medical History  He has a past medical history of Chronic systolic (congestive) heart failure (Multi) (01/03/2017), Hemorrhage of anus and rectum (12/14/2015), Personal history of other infectious and parasitic diseases, Pure hyperglyceridemia, Sciatica, unspecified side, and ST elevation (STEMI) myocardial infarction involving right coronary artery (Multi) (10/12/2015).    Surgical History  He has a past surgical history that includes Back surgery (12/14/2015) and Other surgical history (12/14/2015).     Social History  He reports that he has quit smoking. His smoking use included cigarettes. He has never used smokeless tobacco. He reports current alcohol use. He reports that he does not use drugs.    Family History  Family History   Problem Relation Name Age of Onset    No Known Problems Mother      No Known Problems Father          Allergies  Ticagrelor, Ciprofloxacin, and Atorvastatin    Review of Systems  Negative as otherwise stated above     Physical Exam  VITALS: I have reviewed the vital signs.   GENERAL: Well developed adult in no acute distress.  Resting comfortably in bed.  NEURO: Alert and oriented x3, able to answer questions and follow commands. No motor or sensory deficits. Moves all extremities. Face is symmetric and expressive. No tremor.  EYES: PERRL. No scleral icterus or conjunctival injection. No discharge.   HENT: Normocephalic, atraumatic. Hearing is grossly intact. Nares grossly patent and without discharge. Mucous membranes moist.   NECK: No JVD. Patient moves neck without restriction.   CARDIO: Rhythm regular. Normal rate. No murmur, rub, or gallop. Pulses equal bilaterally in the upper and lower extremity. No lower extremity edema.   PULM: Lungs clear to auscultation in all fields. No  wheezes, rales, or rhonchi. No conversational dyspnea. No splinting, stridor, or accessory muscle use.    GI: Abdomen is soft and non-distended. No tenderness to palpation. No guarding or rigidity. Normoactive bowel sounds.   : No suprapubic tenderness. No CVA tenderness.  EXTREMITIES: Symmetric muscle bulk. No joint swelling. No clubbing, cyanosis, or deformity. Muscle strength 5/5 in b/l upper and lower extremities  SKIN: Warm and dry. Normal turgor. No rash or lesions appreciated.   PSYCH: Mood, affect, and interaction is appropriate to the setting. Not internally stimulated.    Last Recorded Vitals  /68 (BP Location: Left arm, Patient Position: Lying)   Pulse 69   Temp 36 °C (96.8 °F) (Temporal)   Resp 16   Wt 114 kg (252 lb 3.3 oz)   SpO2 96%     Relevant Results  Scheduled medications  aspirin, 81 mg, oral, Daily  clopidogrel, 75 mg, oral, Daily  enoxaparin, 40 mg, subcutaneous, q24h  rosuvastatin, 40 mg, oral, Nightly      Continuous medications     PRN medications    Results for orders placed or performed during the hospital encounter of 09/25/24 (from the past 24 hour(s))   CBC   Result Value Ref Range    WBC 7.6 4.4 - 11.3 x10*3/uL    nRBC 0.0 0.0 - 0.0 /100 WBCs    RBC 4.43 (L) 4.50 - 5.90 x10*6/uL    Hemoglobin 14.7 13.5 - 17.5 g/dL    Hematocrit 43.6 41.0 - 52.0 %    MCV 98 80 - 100 fL    MCH 33.2 26.0 - 34.0 pg    MCHC 33.7 32.0 - 36.0 g/dL    RDW 13.7 11.5 - 14.5 %    Platelets 244 150 - 450 x10*3/uL   Comprehensive metabolic panel   Result Value Ref Range    Glucose 103 (H) 74 - 99 mg/dL    Sodium 139 136 - 145 mmol/L    Potassium 3.4 (L) 3.5 - 5.3 mmol/L    Chloride 106 98 - 107 mmol/L    Bicarbonate 26 21 - 32 mmol/L    Anion Gap 10 10 - 20 mmol/L    Urea Nitrogen 11 6 - 23 mg/dL    Creatinine 0.53 0.50 - 1.30 mg/dL    eGFR >90 >60 mL/min/1.73m*2    Calcium 8.8 8.6 - 10.3 mg/dL    Albumin 4.1 3.4 - 5.0 g/dL    Alkaline Phosphatase 60 33 - 120 U/L    Total Protein 6.4 6.4 - 8.2 g/dL     AST 37 9 - 39 U/L    Bilirubin, Total 0.6 0.0 - 1.2 mg/dL    ALT 52 10 - 52 U/L   Protime-INR   Result Value Ref Range    Protime 11.6 9.8 - 12.8 seconds    INR 1.0 0.9 - 1.1   Magnesium   Result Value Ref Range    Magnesium 1.99 1.60 - 2.40 mg/dL   Troponin I, High Sensitivity   Result Value Ref Range    Troponin I, High Sensitivity 5 0 - 20 ng/L   Troponin I, High Sensitivity   Result Value Ref Range    Troponin I, High Sensitivity 4 0 - 20 ng/L     No results found.       Assessment/Plan     # Angina, unstable  # CAD s/p multiple PCI, most recent 9/2023  # Tobacco abuse  # Chronic diastolic HF, stable and not in exacerbation  Symptoms occurred for patient at rest beginning 7 AM yesterday.  He continues to smoke 1.5 PPD.  He had significant increase in caffeine use yesterday outside of his baseline which could have stimulated symptomatology. Symptoms lasted approximately 2 to 3 hours and resolved spontaneously. He suffered dull angina in the left side of his chest with radiation and pain to the left shoulder.  He never suffered diaphoresis, palpitations, SOB.  Given significant cardiac history and concern for recurrent MI, the patient proceeded to the ER for further evaluation  -Troponin negative x 2.  EKG stable without ischemia.  Nonpathologic Q-wave in lead II  -Given recent PCI in 9/2023, continue ASA and Plavix inpatient  -At this time, will undergo Lexiscan for further evaluation  -Pending results of Lexiscan, may discontinue Plavix as it has been approximately 1 year since last PCI  -Patient counseled on smoking cessation.  He will think about quitting.  Per last PCP note 9/2023, he had reported that he had stopped smoking.  Would entertain nicotine patch as inpatient with nicotine gum/patch/other medicinal aid for tobacco cessation if patient agreeable  -Continue metoprolol and Crestor and as needed Lasix  -Cardiology will continue to follow      Patient seen and discussed with attending  physician    Carlos Osborne, DO  Internal Medicine, PGY-III

## 2024-09-26 NOTE — DISCHARGE SUMMARY
Discharge Diagnosis  Chest pain    Issues Requiring Follow-Up  Please see PCP in 1 week  Please see cardiology dr jones in 2 weeks or as discussed       Discharge Meds     Medication List      CONTINUE taking these medications     aspirin 81 mg chewable tablet   clopidogrel 75 mg tablet; Commonly known as: Plavix   ezetimibe 10 mg tablet; Commonly known as: Zetia   losartan 25 mg tablet; Commonly known as: Cozaar   metoprolol succinate XL 50 mg 24 hr tablet; Commonly known as: Toprol-XL   omeprazole 20 mg DR capsule; Commonly known as: PriLOSEC   rosuvastatin 40 mg tablet; Commonly known as: Crestor   sennosides-docusate sodium 8.6-50 mg tablet; Commonly known as:   Vi-Colace       Test Results Pending At Discharge  Pending Labs       No current pending labs.            Hospital Course   53 YOM PMH CAD s/p PCI to LCx and RCA, dyslipidemia, and chronic diastolic heart failure presenting to [facility] C/O chest pain.     Pain began this a.m. while patient was sitting at his desk.  Duration lasted for 2 to 3 hours he stated.  Pain described as a dull achy pain in the left side of his chest radiating to the shoulder.  Did not radiate to the neck or jaw or back.  He denies associated symptoms of SOB, palpitations, diaphoresis, nausea/emesis, dizziness or presyncope.     Records are not viewable from [facility], he was transferred to [facility] due to request to be evaluated by his cardiologist Dr. Jones.    Hospital course:  Patient is a 53-year-old male with history of CAD status post 4 cardiac stents, CHF, hyperlipidemia who presented with chest pain.  Patient's troponin was negative, cardiology was consulted.  EKG showed no ischemic changes.  Patient went for stress test, no evidence of inducible myocardial ischemia.  Awaiting final cardiology input however plan will be for discharge after seen by cardiology Dr. Jones today.  I did discuss results with patient.  I have prepped his discharge pending cardiac clearance. See  PCP in 1 week and cardio dr jones in 2 weeks.    Spent > 35 minutes DC patient    Pertinent Physical Exam At Time of Discharge  Physical Exam   Constitutional: A&Ox4, NAD, resting comfortable   Head and Face: Atraumatic, normocephalic   Eyes: Normal external exam, EOMI  ENT: Normal external inspection of ears and nose. Oropharynx normal.  Cardiovascular: RRR, S1/S2, no murmurs, rubs, or gallops, radial pulses +2  Pulmonary: CTAB, no respiratory distress, no wheezing, rales or rhonchi, on RA  Abdomen: +BS, soft, non-tender, nondistended, no guarding rigidity or rebound tenderness, no masses noted  MSK: Negative for edema, No joint swelling, normal movements of all extremities.   Neuro: No focal deficits, normal motor function, normal sensation, follows all commands  Skin- No lesions, contusions, or erythema.  Psychiatric: Judgment intact. Appropriate mood, affect and behavior     Outpatient Follow-Up  Future Appointments   Date Time Provider Department Center   11/21/2024  4:15 PM Jerry Jones MD [id number] [place]         Rocio Farrell DO

## 2024-09-26 NOTE — PROGRESS NOTES
09/26/24 1043   Discharge Planning   Living Arrangements Spouse/significant other   Support Systems Spouse/significant other   Type of Residence Private residence   Home or Post Acute Services None   Expected Discharge Disposition Home   Does the patient need discharge transport arranged? No     Met with patient at bedside. Admitted for chest pain. Pt lives with spouse and was independent PTA with no HHC or DME. PCP is Luann Shelton. Pt feels he is able to manage his health and understands his medications. Pt works full time, is able to drive and obtain medications. Pt plans to return home with no new discharge needs.

## 2024-09-26 NOTE — SIGNIFICANT EVENT
Patient seen and examined, no acute events overnight.  Patient is a 49-year-old male with history of CAD status post 4 cardiac stents who presented with chest pain.  Cardiology was consulted and patient is going to have a stress test today.  He denies any current chest pain.  He appears comfortable on exam.  His vitals are stable.  His home meds have been ordered.  Anticipate DC within the next 24 hours pending stress test results and cardiology input/plan. He is on aspirin, statin and plavix.

## 2024-09-27 LAB
ATRIAL RATE: 82 BPM
P AXIS: 45 DEGREES
P OFFSET: 207 MS
P ONSET: 155 MS
PR INTERVAL: 148 MS
Q ONSET: 229 MS
QRS COUNT: 13 BEATS
QRS DURATION: 74 MS
QT INTERVAL: 400 MS
QTC CALCULATION(BAZETT): 467 MS
QTC FREDERICIA: 444 MS
R AXIS: 81 DEGREES
T AXIS: 26 DEGREES
T OFFSET: 429 MS
VENTRICULAR RATE: 82 BPM

## 2024-10-04 DIAGNOSIS — I50.32 CHRONIC DIASTOLIC HEART FAILURE: Primary | ICD-10-CM

## 2024-11-21 ENCOUNTER — OFFICE VISIT (OUTPATIENT)
Dept: CARDIOLOGY | Facility: CLINIC | Age: 49
End: 2024-11-21
Payer: COMMERCIAL

## 2024-11-21 ENCOUNTER — APPOINTMENT (OUTPATIENT)
Dept: CARDIOLOGY | Facility: CLINIC | Age: 49
End: 2024-11-21
Payer: COMMERCIAL

## 2024-11-21 VITALS
SYSTOLIC BLOOD PRESSURE: 134 MMHG | OXYGEN SATURATION: 97 % | WEIGHT: 250 LBS | DIASTOLIC BLOOD PRESSURE: 74 MMHG | BODY MASS INDEX: 35.79 KG/M2 | HEART RATE: 70 BPM | HEIGHT: 70 IN

## 2024-11-21 DIAGNOSIS — I25.10 ATHSCL HEART DISEASE OF NATIVE CORONARY ARTERY W/O ANG PCTRS: Primary | ICD-10-CM

## 2024-11-21 DIAGNOSIS — G47.20 DISTURBED SLEEP RHYTHM: ICD-10-CM

## 2024-11-21 PROCEDURE — 3078F DIAST BP <80 MM HG: CPT | Performed by: INTERNAL MEDICINE

## 2024-11-21 PROCEDURE — 93010 ELECTROCARDIOGRAM REPORT: CPT | Performed by: INTERNAL MEDICINE

## 2024-11-21 PROCEDURE — 93005 ELECTROCARDIOGRAM TRACING: CPT | Performed by: INTERNAL MEDICINE

## 2024-11-21 PROCEDURE — 3075F SYST BP GE 130 - 139MM HG: CPT | Performed by: INTERNAL MEDICINE

## 2024-11-21 PROCEDURE — 99214 OFFICE O/P EST MOD 30 MIN: CPT | Performed by: INTERNAL MEDICINE

## 2024-11-21 PROCEDURE — 1036F TOBACCO NON-USER: CPT | Performed by: INTERNAL MEDICINE

## 2024-11-21 PROCEDURE — 3008F BODY MASS INDEX DOCD: CPT | Performed by: INTERNAL MEDICINE

## 2024-11-21 ASSESSMENT — PAIN SCALES - GENERAL: PAINLEVEL_OUTOF10: 0-NO PAIN

## 2024-11-21 ASSESSMENT — ENCOUNTER SYMPTOMS
LOSS OF SENSATION IN FEET: 0
OCCASIONAL FEELINGS OF UNSTEADINESS: 0
DEPRESSION: 0

## 2024-11-21 NOTE — PROGRESS NOTES
Chief Complaint:   No chief complaint on file.     History Of Present Illness:    Williams Harley is a 49 y.o. male with a history of CAD s/p PCI to LCx and RCA, dyslipidemia, and chronic diastolic heart failure being evaluated for routine follow-up.     Patient presented to the hospital 9/26/2024 for chest pain.  Enzymes were negative and stress test demonstrated no evidence of ischemia.  In talking to him it seemed like he had not been sleeping very well.  We discussed being evaluated for sleep apnea as an outpatient.  He has not yet scheduled this.    He has had no recurrence of chest pain.  He says he feels well.  No palpitations or shortness of breath.    Lexiscan nuclear stress test 9/26/2024: Fixed defect in the mid to distal anterior wall concerning for scar.  Hypokinesis and abnormal wall thickening in the same segment.  EF 45%.     PCI LCx March 2022     Lexiscan nuclear stress test 2/25/2022: No evidence of ischemia or scar. With thinning artifact suggested. EF 54%.     Treadmill stress test 10/4/19. Submaximal workload with no evidence of ischemia. Reduce sensitivity due to the submaximal workload. Spoke with the patient and we decided to continue to observe. Head given the option of performing pharmacologic nuclear stress testing however he chose to continue to observe.     PVRs with exercise 4/16/18 demonstrating mild ischemia of the right leg at rest worsening with ambulation although still in the mild range. Left lower extremity did demonstrate a significant decrease in the JAQUELIN with exercise however no evidence of ischemia. Vasculogenic claudication in the right greater than the left.     PCI to the RCA 6/13/17 with BIPIN x 2 (3.5 x 15 and 3.5 x 12 mm Resolute BIPIN)     Echocardiogram 6/16/17 demonstrating a technically difficult study. LV systolic motion was normal EF 55-60%. Grade 1 diastolic dysfunction. Normal RV size and function. Mild to moderate MR. Mild TR.     Catheterization 9/22/15 for acute  "inferior STEMI. PCI of the RCA with Resolute 2.75 x 18 mm BIPIN.      Echocardiogram 9/23/15 demonstrating normal LV size with low-normal function, EF 50-55%. Mild hypokinesis of the basal inferior wall. Normal RV size and function. Mild MR and TR. Small pericardial effusion posteriorly without evidence of tamponade physiology.     Allergies:  Ticagrelor, Ciprofloxacin, and Atorvastatin    Outpatient Medications:  Current Outpatient Medications   Medication Instructions    aspirin 81 mg, Daily    clopidogrel (PLAVIX) 75 mg, Daily    ezetimibe (ZETIA) 10 mg, Daily    losartan (COZAAR) 25 mg, Daily    metoprolol succinate XL (TOPROL-XL) 50 mg, Daily    omeprazole (PRILOSEC) 20 mg, Daily before breakfast    rosuvastatin (CRESTOR) 40 mg, Nightly    sennosides-docusate sodium (Vi-Colace) 8.6-50 mg tablet 1 tablet, 2 times daily       Last Recorded Vitals:  Visit Vitals  /74 (BP Location: Left arm, Patient Position: Sitting)   Pulse 70   Ht 1.778 m (5' 10\")   Wt 113 kg (250 lb)   SpO2 97%   BMI 35.87 kg/m²   Smoking Status Former   BSA 2.36 m²      LASTWT(3):   Wt Readings from Last 3 Encounters:   11/21/24 113 kg (250 lb)   09/25/24 114 kg (252 lb 3.3 oz)   09/25/24 113 kg (249 lb 1.9 oz)       Physical Exam:  In general: alert and in no acute distress.   HEENT: Carotid upstrokes normal with no bruits. JVP is normal.   Pulmonary: Clear to auscultation bilaterally.  Cardiovascular: S1, S2, regular. No appreciable murmurs, rubs or gallops.   Lower extremities: Warm. 2+ distal pulses. No edema.     Last Labs:  CBC -  Recent Labs     09/25/24 2031 12/13/22  0615 12/12/22  1339   WBC 7.6 8.0 10.2   HGB 14.7 15.0 14.7   HCT 43.6 46.0 43.7    257 265   MCV 98 98 96       CMP -  Recent Labs     09/25/24 2032 12/13/22  0615 12/12/22  1339    139 138   K 3.4* 4.6 3.3*    103 103   CO2 26 27 28   ANIONGAP 10 14 10   BUN 11 12 15   CREATININE 0.53 0.55 0.59   EGFR >90  --   --    MG 1.99 2.23  --  "     Recent Labs     09/25/24 2032 12/12/22  1339 11/04/22  0846   ALBUMIN 4.1 4.3 4.5   ALKPHOS 60 73 77   ALT 52 26 44   AST 37 25 34   BILITOT 0.6 0.5 0.4       LIPID PANEL -   Recent Labs     12/13/22  0615 11/04/22  0846 10/12/21  1115   CHOL 179 175 189   LDLF 86 92 78   HDL 40.8 41.0 35.0*   TRIG 259* 210* 381*       Recent Labs     09/20/23  0117 12/13/22  0615 11/04/22  0846   HGBA1C 5.6 5.7* 5.5           Assessment/Plan   1) CAD: PCI to LCx March 2022. Continue aspirin and high intensity statin.  Recent stress test with no evidence of ischemia.     2) dyslipidemia: Continue rosuvastatin 40 mg daily and ezetimibe 10 mg daily.  We will need a repeat lipid panel in the future to reassess his LDL.     3) chronic diastolic heart failure: Euvolemic.       4) hypertension: Blood pressure well controlled.  Recommend continuing his metoprolol succinate 25 mg daily.     5) disturbed sleep: I put an order in for referral to the adult sleep medicine team at .  Explained that he could have a sleep study, if needed, performed in [place] which would be closer to his house.     6) follow-up: 6 months or sooner if needed       Jerry Jones MD

## 2024-12-03 ENCOUNTER — OFFICE VISIT (OUTPATIENT)
Dept: SLEEP MEDICINE | Facility: CLINIC | Age: 49
End: 2024-12-03
Payer: COMMERCIAL

## 2024-12-03 VITALS
SYSTOLIC BLOOD PRESSURE: 133 MMHG | OXYGEN SATURATION: 98 % | HEART RATE: 72 BPM | BODY MASS INDEX: 35.79 KG/M2 | DIASTOLIC BLOOD PRESSURE: 66 MMHG | WEIGHT: 250 LBS | HEIGHT: 70 IN

## 2024-12-03 DIAGNOSIS — I10 HYPERTENSION, UNSPECIFIED TYPE: ICD-10-CM

## 2024-12-03 DIAGNOSIS — F17.210 CIGARETTE NICOTINE DEPENDENCE WITHOUT COMPLICATION: ICD-10-CM

## 2024-12-03 DIAGNOSIS — G47.20 DISTURBED SLEEP RHYTHM: ICD-10-CM

## 2024-12-03 DIAGNOSIS — F41.8 DEPRESSION WITH ANXIETY: ICD-10-CM

## 2024-12-03 DIAGNOSIS — G47.9 SLEEP DISTURBANCE: Primary | ICD-10-CM

## 2024-12-03 PROCEDURE — 99215 OFFICE O/P EST HI 40 MIN: CPT | Performed by: NURSE PRACTITIONER

## 2024-12-03 PROCEDURE — 3078F DIAST BP <80 MM HG: CPT | Performed by: NURSE PRACTITIONER

## 2024-12-03 PROCEDURE — 3075F SYST BP GE 130 - 139MM HG: CPT | Performed by: NURSE PRACTITIONER

## 2024-12-03 PROCEDURE — 3008F BODY MASS INDEX DOCD: CPT | Performed by: NURSE PRACTITIONER

## 2024-12-03 PROCEDURE — 1036F TOBACCO NON-USER: CPT | Performed by: NURSE PRACTITIONER

## 2024-12-03 PROCEDURE — 99205 OFFICE O/P NEW HI 60 MIN: CPT | Performed by: NURSE PRACTITIONER

## 2024-12-03 ASSESSMENT — SLEEP AND FATIGUE QUESTIONNAIRES
SITING INACTIVE IN A PUBLIC PLACE LIKE A CLASS ROOM OR A MOVIE THEATER: WOULD NEVER DOZE
SLEEP_PROBLEM_NOTICEABLE_TO_OTHERS: SOMEWHAT
HOW LIKELY ARE YOU TO NOD OFF OR FALL ASLEEP WHILE SITTING AND READING: WOULD NEVER DOZE
ESS-CHAD TOTAL SCORE: 1
HOW LIKELY ARE YOU TO NOD OFF OR FALL ASLEEP WHILE SITTING AND TALKING TO SOMEONE: WOULD NEVER DOZE
WORRIED_DISTRESSED_DUE_TO_SLEEP: MUCH
SATISFACTION_WITH_CURRENT_SLEEP_PATTERN: VERY DISSATISFIED
DIFFICULTY_FALLING_ASLEEP: VERY SEVERE
HOW LIKELY ARE YOU TO NOD OFF OR FALL ASLEEP WHILE SITTING QUIETLY AFTER LUNCH WITHOUT ALCOHOL: WOULD NEVER DOZE
DIFFICULTY_STAYING_ASLEEP: VERY SEVERE
HOW LIKELY ARE YOU TO NOD OFF OR FALL ASLEEP IN A CAR, WHILE STOPPED FOR A FEW MINUTES IN TRAFFIC: WOULD NEVER DOZE
HOW LIKELY ARE YOU TO NOD OFF OR FALL ASLEEP WHILE LYING DOWN TO REST IN THE AFTERNOON WHEN CIRCUMSTANCES PERMIT: WOULD NEVER DOZE
HOW LIKELY ARE YOU TO NOD OFF OR FALL ASLEEP WHEN YOU ARE A PASSENGER IN A CAR FOR AN HOUR WITHOUT A BREAK: WOULD NEVER DOZE
WAKING_TOO_EARLY: VERY SEVERE
HOW LIKELY ARE YOU TO NOD OFF OR FALL ASLEEP WHILE WATCHING TV: SLIGHT CHANCE OF DOZING
SLEEP_PROBLEM_INTERFERES_DAILY_ACTIVITIES: MUCH

## 2024-12-03 NOTE — ASSESSMENT & PLAN NOTE
Likely multifactorial including poor sleep hygiene, likely untreated LALA, comorbidities, depression, anxiety.   Discussed anxiety/ rumination management strategies. Would likely benefit from CBT-I/ counseling, psychotherapy or medication management. Discussed with Williams today, he plans to look into CBT-I.  Discussed HSAT to evaluate for sleep disordered breathing. He is agreeable, voices hesitation with considering CPAP. May be beneficial for sleep maintenance however.

## 2024-12-03 NOTE — PATIENT INSTRUCTIONS
Home Sleep Study ordered    You have been recommended to Cognitive Behavioral Therapy for Insomnia (CBT-I). CBT-I is widely recognized as an effective treatment for a wide range of insomnias. The treatment is typically made up of a number of components including assessment, behavioral and cognitive interventions, motivational techniques and relapse prevention skills. An overwhelming amount of evidence suggests that CBT-I is as effective as hypnotics and the newer non-benzodiazepine sleep aides in the acute treatment and is more effective than medication in the long term treatment of insomnia.     There are several approaches to CBTi:    See a Behavioral Sleep Medicine specialist for one-on-one counseling    CBT-I at  - Limited availability -   CBT-I at the [facility] - Dr. Mega Reed PsJess or Dr. Nancy Boston, PhD - Phone: [phone]  Fax: [phone]. There may be a several month waiting period.  CBT-I at [facility] - Daija Acevedo PsyD (Call [phone] and speak with Kalpana Naranjo  Fax: [phone] or backup fax: [phone])  Dr. Noemi Alfred - [email or web address]  - She only does telemedicine. She was formerly part of  but is in private practice and would be out-of-network  Dr. Nicole - one on one CBT-I service www.[employer]. You may have to pay out of pocket and submit visits to your insurance for reimbursement.  Other BSM providers in [place]:  https://www.behavioralsleep.org/index.php/directory/browse-by/state?value=[place]      Do CBTi using an online platform:    There are several online platforms that are good resources, but may vary based on cost. These include:    Amita- online course via [facility]. Self-guided. One time charge to sign up: Amita ($40)  The SleepReset - online guided cognitive behavioral therapy https://[email or web address] ($300 for 8 weeks)  Sleepio - https://www.sleepio.com/sleepio/welcomeusint/354#1/1 (Some insurances or employers may  cover - check with your HR Benefits office)  SleepEZ- Free self-guided course from the [facility] https://www.veterantraining.va.gov/insomnia/       [facility] Sleep Medicine  [facility] [address]  [facility]  [address] [place] [address]       NAME: Williams Harley   DATE:  12/03/24    DIAGNOSIS:   1. Sleep disturbance  Home sleep apnea test (HSAT)      2. Disturbed sleep rhythm  Referral to Adult Sleep Medicine          Thank you for coming to the Sleep Medicine Clinic today! Your sleep medicine provider today was: JANET Moreno Below is a summary of your treatment plan, other important information, and our contact numbers:    TREATMENT PLAN:   - Follow-up in 2-3 months.  - If not already done, sign up for 'My Chart' and send prescription requests or messages through this    Scheduling a Sleep Study    Call the  Sleep Testing Center to speak with a sleep testing  to book your overnight sleep study procedure at one of our adult and pediatric-friendly sleep labs. Overnight sleep studies may be scheduled on a weekday or weekend.    We have child life services on a case by case basis at the [facility] location. We also perform daytime testing for shift workers on a case by case basis.    Locations for sleep studies are: [facility] ([facility]), [facility], [facility], [facility], [place], [place].    Bring your usual medications and nightly routine items for your sleep study. In order to fall asleep faster in sleep lab, we advise patients to wake up earlier on the morning of the scheduled testing and avoid napping prior to testing. Sometimes, your provider may prescribe a sleep aid to be taken at lights out in the sleep lab. If you are taking a sleep aid, please have somebody pick you up after the sleep testing.    Results of your sleep study will be given to the ordering clinician. Please contact their office for results  "or follow up as directed by your clinician.    For additional information about the sleep medicine services, please call [phone]       Insomnia care:  Insomnia, or trouble falling asleep or staying asleep, can be caused by many different things such as untreated sleep apnea, anxiety, depression, stress, poor sleep habits and other medical conditions or medications. The best way to treat insomnia is to treat the cause. In general, we can all benefit from better sleep hygiene. Some recommendations to help you improve your sleep hygiene so you can fall asleep, stay asleep, and wake up felling refreshed include:    Keep a routine bedtime and rise time.  Avoid caffeine in the late afternoon and early evening, including chocolate.   Keep your bedroom technology free. Avoid TV and electronics one hour prior to bedtime. Don't have a clock visible in your room.  Set up a 'worry\" time in the late afternoon to cope with her worries and plan for the following day.  Avoid naps. If necessary, keep naps to under 15-20 minutes.  Develop a relaxing routine before bed.  Keep the bedroom for sleeping and intimacy only.  Make your bedroom dark, quiet, and comfortable temperature.  Do not exercise right before bed. Do get 20-30 minutes of exercise during your day.   Do not stay in bed for more than 30 minutes if you cannot sleep. Leave your bedroom and find a dull activity to do until you feel you could sleep. Then return to your bed.   Don't sleep with your pet.   A light bedtime snack such as a glass of milk and banana can promote sleep.    You have been recommended to Cognitive Behavioral Therapy for Insomnia (CBT-I). CBT-I is widely recognized as an effective treatment for a wide range of insomnias. The treatment is typically made up of a number of components including assessment, behavioral and cognitive interventions, motivational techniques and relapse prevention skills. An overwhelming amount of evidence suggests that CBT-I " is as effective as hypnotics and the newer non-benzodiazepine sleep aides in the acute treatment and is more effective than medication in the long term treatment of insomnia.     CBT-I at  - Delmi Bhavesh, NP  GoToSleep- online course via [facility]. Self-guided. One time charge to sign up: Amita  SleepEZ- Free self-guided course from the VA https://www.veterantraining.va.gov/insomnia/  Dr. Nicole - one on one CBT-I service www.Tykli       Instructions - Common LALA Recs: - For your sleep apnea, continue to use your PAP every night and use it whenever you are sleeping.   - Avoid alcohol or sedatives several hours prior to sleeping.   - Get additional supplies for your PAP (e.g., mask, hose, filters) every 3 months or as your insurance allows from your [employer] company. Replacement cushions for your PAP mask can be requested monthly if airseals are an issue.  - Remember to clean your mask, tubings, and water chamber regularly as instructed.  - Avoid driving or operating heavy machinery when drowsy. A person driving while sleepy is five (5) times more likely to have an accident. If you feel sleepy, pull over and take a short power nap (sleep for less than 30 minutes). Otherwise, ask somebody to drive you.    EASY WAYS TO IMPROVE YOUR SLEEP:  1. Go to bed and wake up at the same time every day.   Aim for 8 hours but some people need less, some need more.   Get out of bed if you are not sleeping.   Limit naps to 20 min or less.   2. Expose yourself to daylight and/ or bright light in the morning.   Go outside or spend time near a window each morning.   You can use a light box (found on Amazon) if you wake before the sunrise.   Limit light exposure in the evenings (including electronic usage).   Try meditation, reading, stretching, deep breathing, warm shower or bath, or yoga nidra as part of your bedtime routine. There are many great FREE, videos or audio tracks on [employer]/ [employer], etc for guidance.  3. Exercise,  in some form, EVERY day, but not too close to bedtime. Consider making this part of your routine at the start of your day, followed by a cool shower.  4. Eat meals at roughly the same time every day. Make sure you are prioritizing fruits, vegetables, whole grains, lean proteins.  5. Time your caffeine intake. Make sure you are not drinking caffeine within 8 to 12 hours prior to your bedtime.   6. Avoid marijuana, alcohol, and nicotine. They will reduce sleep quality in any quantity.  7. Learn to manage anxiety. Psychology services at  can be reached at [phone] to schedule an appointment.     IMPORTANT INFORMATION:     Call 911 for medical emergencies.  Our offices are generally open from Monday-Friday, 9 am - 5 pm.  If you need to get in touch with me, you may either call me and my team(number is below) or you can use [employer].  If a referral for a test, for CPAP, or for another specialist was made, and you have not heard about scheduling this within a week, please call scheduling at [phone] ([phone]).  If you are unable to make your appointment for clinic or an overnight study, kindly call the office at least 48 hours in advance to cancel and reschedule.  If you are on CPAP, please bring your device's card to each clinic appointment unless told otherwise by your provider.  There are no supporting services by either the sleep doctors or their staff on weekends and Holidays, or after 5 PM on weekdays.   If you have been asked to come to a sleep study, make sure you bring toiletries, a comfy pillow, and any nighttime medications that you may regularly take. Also be sure to eat dinner before you arrive. We generally do not provide meals.      PRESCRIPTIONS:  We require 7 days advanced notice for prescription refills. If we do not receive the request in this time, we cannot guarantee that your medication will be refilled in time. Please contact the sleep nurses listed below for refills or request via 99dresses.      IMPORTANT PHONE NUMBERS:   Sleep Medicine Clinic Fax: [phone]  Appointments (for Pediatric Sleep Clinic): [phone] - option 1  Appointments (for Adult Sleep Clinic): [phone] - option 2  Appointments (For Sleep Studies): [phone] - option 3  Behavioral Sleep Medicine: [phone]  Sleep Surgery: [phone]  ENT (Otolaryngology): [phone]  Headache Clinic (Neurology): [phone]  Neurology: [phone]  Psychiatry: [phone]  Pulmonary Function Testing (PFT) Center: [phone]  Pulmonary Medicine: [phone]  [employer] (DME): [phone]  [employer] (DME): [phone]  [facility] ([facility]): [phone]    Our Adult Sleep Medicine Team (Please do not hesitate to call the office or sleep nurse with any questions between appointments):    Adult Sleep Nurses (Karen Nash, STEVE and Haylee Brothers RN):  For clinical questions and refilling prescriptions: [phone]  Email sleep diaries and other documents at: [email or web address]    Adult Sleep Medicine Secretaries:  Molly Montenegro (For Edith/Live/Krise/Strohl/Yeh): P: [phone]  F: [phone]  Donovan Dolan ([place])Office: [phone] option 4 Office Fax: [phone]  Xochilt Rao (For Razo): P: [phone]  Fax: [phone]  Nahomi Nur (For Jurcevic/Blank): P: [phone]  F: [phone]  Evi Quiñonez (For Bhavesh): P: [phone]  F: [phone]  Steph Jacobo (For Ana Lilia/Hola/Zakhary): P: [phone]  F: [phone]  Selam Lee (For Dasilva/Poole): P: [phone]  F: [phone]     Adult Sleep Medicine Advanced Practice Providers:  Garcia Bee ([place], [place])  Kylie Simental ([facility])  Fátima Fitzgerald CNP ([facility])  Lorena Wheat CNP ([place], [place], [facility])  Delmi Ospina ([facility], [facility])  Pito Poole CNP ([facility])      Our Sleep Testing Center  "([facility]) Locations:  Our team will contact you to schedule your sleep study, however, you can contact us as follow:  Main Phone Line (scheduling only): [phone]), option 3  Adult and Pediatric Locations   [place] (6 years and older): [facility] - 4th floor ([address] [facility] [address]) After hours line: [phone]  [facility] (Main campus: All ages): [facility], 6th floor. After hours line: [phone]   [place] (5 years and older; younger considered on case-by-case basis): [address]; [address], [address]. Scheduling  After hours line: [phone]   [place] (6 years and older): [address]; [address]; [address]   [place] (6 years and older): [address] [facility] [address]  After hours line: [phone]   [place] (13 years and older) in [place]: [address], 2nd floor  After hours line: [phone]   [place] (13 year and older): [address]  After hours line: [phone] (Home studies out of [facility])    Adult Only Locations:   [place] (18 years and older): [address] [facility], 2nd floor   [place] (18 years and older): [address] [facility]; 4th floor  After hours line: [phone]  [facility] (18 years and older) at [place]: [address] [facility]  After hours line: [phone]        CONTACTING YOUR SLEEP MEDICINE PROVIDER:  Send a message directly to your provider through \"My Chart\", which is the email service through your  Records Account: [email or web address]   Call [phone] and leave a message. One of the administrative assistants will forward the message to your sleep medicine provider through \"My Chart\" and/or email.     Your sleep medicine provider for this visit was: JANET Moreno    In the event that you are running more than 15 minutes late to your appointment, I will kindly ask you to " reschedule.

## 2024-12-03 NOTE — PROGRESS NOTES
Patient: Williams Harley    [id number]  : 1975 -- AGE 49 y.o.    Provider: JANET Moreno     Location [facility]   Service Date: 12/3/2024              [facility]  New Visit Note      HISTORY OF PRESENT ILLNESS     The patient's referring provider is: Jerry Jones MD    HISTORY OF PRESENT ILLNESS   Williams Harley is a 49 y.o. male who presents to a [facility] for a sleep medicine evaluation with concerns of disturbed sleep.  for nursing homes, on call.     The patient has h/o  has a past medical history of Chronic systolic (congestive) heart failure (2017), Hemorrhage of anus and rectum (2015), Personal history of other infectious and parasitic diseases, Pure hyperglyceridemia, Sciatica, unspecified side, and ST elevation (STEMI) myocardial infarction involving right coronary artery (Multi) (10/12/2015)..    Past Sleep History  Patient has not had sleep testing in the past.    Current History    On today's visit, the patient reports difficulty falling asleep and staying asleep. Cites rumination and stress as major reasons. Notes he often will fall asleep after 2 or so hours of tossing and turning, often wakes a few hours later and has trouble falling back to sleep. Notes he is often thinking about work, thinking about things to do, wife's difficulties. Was told he may have sleep apnea per PCP. Notes his father had sleep apnea and wore a CPAP.     Sleep schedule  on weekdays / work days:  Usual Bedtime:    9 PM-- sometimes earlier- takes melatonin 3-5 mg; not sure of the dose, not interested in Rx otherwise   Falls asleep around:  11 pm   # of awakenings: 1x per night   Wake time:  2:30 AM- goes back to sleep after bathroom trip less than 1x per month  Gets out of bed if awake at that time, starts to drink coffee, watches TV  Has to be awake by 5:30 to start  day  Feels rested if he gets back to sleep    Naps: none     Preferred sleeping position:     Sleep-related ROS:    Problems going to sleep: rumination/thoughts/worries    Problems staying asleep Problems Staying Asleep: nocturia and no clear reason    Breathing during sleep: nocturnal reflux symptoms    Daytime Symptoms  On awakening patient reports: wake unrefreshed and feels sleepy    RLS screen: denies   Legs feel tired, weak at times, was getting cramping but has since improved    Sleep-related behaviors:     Fatigue: denies     ESS: 1   PIERRE: 24  FOSQ:  32      REVIEW OF SYSTEMS     REVIEW OF SYSTEMS  Review of Systems   All other systems reviewed and are negative.    ALLERGIES AND MEDICATIONS     ALLERGIES  Allergies   Allergen Reactions    Ticagrelor Shortness of breath    Ciprofloxacin Diarrhea and Nausea Only    Atorvastatin Myalgia       MEDICATIONS  Current Outpatient Medications   Medication Sig Dispense Refill    aspirin 81 mg chewable tablet Chew 1 tablet (81 mg) once daily.      clopidogrel (Plavix) 75 mg tablet Take 1 tablet (75 mg) by mouth once daily.      ezetimibe (Zetia) 10 mg tablet Take 1 tablet (10 mg) by mouth once daily.      losartan (Cozaar) 25 mg tablet Take 1 tablet (25 mg) by mouth once daily.      metoprolol succinate XL (Toprol-XL) 50 mg 24 hr tablet Take 1 tablet (50 mg) by mouth once daily.      omeprazole (PriLOSEC) 20 mg DR capsule Take 1 capsule (20 mg) by mouth once daily in the morning. Take before meals. Do not crush or chew.      rosuvastatin (Crestor) 40 mg tablet Take 1 tablet (40 mg) by mouth once daily at bedtime.      sennosides-docusate sodium (Vi-Colace) 8.6-50 mg tablet Take 1 tablet by mouth 2 times a day.       No current facility-administered medications for this visit.         PAST HISTORY     PAST MEDICAL HISTORY  Past Medical History:   Diagnosis Date    Chronic systolic (congestive) heart failure 01/03/2017    Chronic systolic heart failure    Hemorrhage of  "anus and rectum 2015    Bright red blood per rectum    Personal history of other infectious and parasitic diseases     History of Helicobacter infection    Pure hyperglyceridemia     Hypertriglyceridemia    Sciatica, unspecified side     Acute sciatica    ST elevation (STEMI) myocardial infarction involving right coronary artery (Multi) 10/12/2015    ST elevation myocardial infarction (STEMI) involving right coronary artery in recovery phase       PAST SURGICAL HISTORY:  Past Surgical History:   Procedure Laterality Date    BACK SURGERY  2015    Back Surgery    OTHER SURGICAL HISTORY  2015    Cath Stent Placement Number Of Stents Placed:       FAMILY HISTORY  Family History   Problem Relation Name Age of Onset    No Known Problems Mother      No Known Problems Father         DOES/DOES NOT EC: does have a family history of sleep disorder.      SOCIAL HISTORY  He  reports that he has quit smoking. His smoking use included cigarettes. He has never used smokeless tobacco. He reports current alcohol use. He reports that he does not use drugs. He currently lives with his wife.     Caffeine consumption: 4x in the AM, does not drink later   Alcohol consumption: occasionally  Smokin.5 PPD  Marijuana: none    PHYSICAL EXAM     VITAL SIGNS: /66 (BP Location: Left arm, Patient Position: Sitting, BP Cuff Size: Adult)   Pulse 72   Ht 1.778 m (5' 10\")   Wt 113 kg (250 lb)   SpO2 98%   BMI 35.87 kg/m²      PREVIOUS WEIGHTS:  Wt Readings from Last 3 Encounters:   24 113 kg (250 lb)   24 113 kg (250 lb)   24 114 kg (252 lb 3.3 oz)       Physical Exam  Physical Exam   Constitutional: Alert and oriented, cooperative, no obvious distress   HEENT: Non icteric or anemic, EOM WNL bilaterally     Upper Airway Examination:   Modified Mallampati Class: 1  OP Lateral wall narrowing stgstrstastdstest:st st1st Tonsil ndGndrndanddndend:nd nd2nd No high arched palate  Tongue Scalloping: Y  Retrognathia: N  Overjet: N    Neck: " Supple, no JVD, no goiter, no adenopathy  Chest: CTA bilaterally, no wheezing, crackles, rubs   Cardiac: RRR, S1 and S2, no murmur, rub, thrill   Abdomen: Obese, Soft, nontender, no masses, no organomegaly   Extremities: No clubbing, no LL edema   Neuromuscular: Cranial nerves grossly intact, no focal deficits      RESULTS/DATA     Bicarbonate (mmol/L)   Date Value   09/25/2024 26   12/13/2022 27   12/12/2022 28   11/04/2022 29     Nuclear Stress test 9/26/24 : EF 45%      ASSESSMENT/PLAN     Mr. Harley is a 49 y.o. male and He was referred to the [facility] for evaluation of disturbed sleep.     Problem List and Orders  Problem List Items Addressed This Visit             ICD-10-CM    Depression with anxiety F41.8    Hypertension I10     BP at goal today  Follow with PCP           Nicotine dependence, unspecified, uncomplicated F17.200     Cessation advised          Sleep disturbance - Primary G47.9     Likely multifactorial including poor sleep hygiene, likely untreated LALA, comorbidities, depression, anxiety.   Discussed anxiety/ rumination management strategies. Would likely benefit from CBT-I/ counseling, psychotherapy or medication management. Discussed with Williams today, he plans to look into CBT-I.  Discussed HSAT to evaluate for sleep disordered breathing. He is agreeable, voices hesitation with considering CPAP. May be beneficial for sleep maintenance however.          Relevant Orders    Home sleep apnea test (HSAT)    BMI 35.0-35.9,adult Z68.35     Weight loss recommended  Follow up with PCP for recommendations            Other Visit Diagnoses         Codes    Disturbed sleep rhythm     G47.20                      PIPAC

## 2024-12-17 DIAGNOSIS — I10 HYPERTENSION, UNSPECIFIED TYPE: Primary | ICD-10-CM

## 2024-12-17 RX ORDER — LOSARTAN POTASSIUM 25 MG/1
25 TABLET ORAL DAILY
Qty: 90 TABLET | Refills: 3 | Status: SHIPPED | OUTPATIENT
Start: 2024-12-17

## 2024-12-17 RX ORDER — CLOPIDOGREL BISULFATE 75 MG/1
75 TABLET ORAL DAILY
Qty: 90 TABLET | Refills: 3 | Status: SHIPPED | OUTPATIENT
Start: 2024-12-17

## 2024-12-27 ENCOUNTER — PROCEDURE VISIT (OUTPATIENT)
Dept: SLEEP MEDICINE | Facility: HOSPITAL | Age: 49
End: 2024-12-27
Payer: COMMERCIAL

## 2024-12-27 DIAGNOSIS — G47.9 SLEEP DISTURBANCE: ICD-10-CM

## 2024-12-27 PROCEDURE — 95806 SLEEP STUDY UNATT&RESP EFFT: CPT | Performed by: STUDENT IN AN ORGANIZED HEALTH CARE EDUCATION/TRAINING PROGRAM

## 2025-01-02 ENCOUNTER — TELEPHONE (OUTPATIENT)
Dept: SLEEP MEDICINE | Facility: HOSPITAL | Age: 50
End: 2025-01-02
Payer: COMMERCIAL

## 2025-01-02 DIAGNOSIS — G47.33 OSA (OBSTRUCTIVE SLEEP APNEA): Primary | ICD-10-CM

## 2025-04-08 ENCOUNTER — APPOINTMENT (OUTPATIENT)
Dept: SLEEP MEDICINE | Facility: CLINIC | Age: 50
End: 2025-04-08
Payer: COMMERCIAL

## 2025-04-11 DIAGNOSIS — E78.5 DYSLIPIDEMIA: Primary | ICD-10-CM

## 2025-04-14 RX ORDER — ROSUVASTATIN CALCIUM 40 MG/1
40 TABLET, COATED ORAL NIGHTLY
Qty: 90 TABLET | Refills: 3 | Status: SHIPPED | OUTPATIENT
Start: 2025-04-14 | End: 2026-04-14

## 2025-06-03 ENCOUNTER — APPOINTMENT (OUTPATIENT)
Dept: CARDIOLOGY | Facility: CLINIC | Age: 50
End: 2025-06-03
Payer: COMMERCIAL